# Patient Record
Sex: FEMALE | Race: WHITE | NOT HISPANIC OR LATINO | Employment: FULL TIME | URBAN - METROPOLITAN AREA
[De-identification: names, ages, dates, MRNs, and addresses within clinical notes are randomized per-mention and may not be internally consistent; named-entity substitution may affect disease eponyms.]

---

## 2023-04-17 ENCOUNTER — OFFICE VISIT (OUTPATIENT)
Dept: PRIMARY CARE | Facility: CLINIC | Age: 28
End: 2023-04-17
Payer: COMMERCIAL

## 2023-04-17 VITALS
DIASTOLIC BLOOD PRESSURE: 68 MMHG | TEMPERATURE: 98.1 F | SYSTOLIC BLOOD PRESSURE: 114 MMHG | BODY MASS INDEX: 23.02 KG/M2 | WEIGHT: 142.6 LBS

## 2023-04-17 DIAGNOSIS — G43.109 MIGRAINE WITH AURA AND WITHOUT STATUS MIGRAINOSUS, NOT INTRACTABLE: Primary | ICD-10-CM

## 2023-04-17 PROCEDURE — 1036F TOBACCO NON-USER: CPT | Performed by: FAMILY MEDICINE

## 2023-04-17 PROCEDURE — 99213 OFFICE O/P EST LOW 20 MIN: CPT | Performed by: FAMILY MEDICINE

## 2023-04-17 RX ORDER — ONDANSETRON 4 MG/1
4 TABLET, ORALLY DISINTEGRATING ORAL EVERY 8 HOURS PRN
Qty: 20 TABLET | Refills: 0 | Status: SHIPPED | OUTPATIENT
Start: 2023-04-17 | End: 2023-04-24

## 2023-04-17 RX ORDER — DROSPIRENONE AND ETHINYL ESTRADIOL 0.02-3(28)
KIT ORAL
COMMUNITY
Start: 2022-08-04

## 2023-04-17 RX ORDER — SUMATRIPTAN SUCCINATE 100 MG/1
100 TABLET ORAL ONCE AS NEEDED
Qty: 27 TABLET | Refills: 3 | Status: SHIPPED | OUTPATIENT
Start: 2023-04-17 | End: 2024-04-16

## 2023-04-17 RX ORDER — OMEPRAZOLE 20 MG/1
20 CAPSULE, DELAYED RELEASE ORAL
COMMUNITY
End: 2023-06-01

## 2023-04-17 ASSESSMENT — ENCOUNTER SYMPTOMS
CLUMSINESS: 0
VOMITING: 0
MEMORY LOSS: 0
AURA: 1
FATIGUE: 1
ALTERED MENTAL STATUS: 0
DIAPHORESIS: 0
FEVER: 0
BOWEL INCONTINENCE: 0
HEADACHES: 1
NAUSEA: 1
VERTIGO: 1
WEAKNESS: 1
NECK PAIN: 1
ABDOMINAL PAIN: 0
VISUAL CHANGE: 1
FOCAL WEAKNESS: 0
SHORTNESS OF BREATH: 0
CONFUSION: 0
LOSS OF BALANCE: 1
DIZZINESS: 1
NEAR-SYNCOPE: 0
SLURRED SPEECH: 0
NEUROLOGIC COMPLAINT: 1
LIGHT-HEADEDNESS: 1
BACK PAIN: 0
PALPITATIONS: 1
FOCAL SENSORY LOSS: 0

## 2023-04-17 NOTE — PROGRESS NOTES
"Subjective   Patient ID: Beatrice Strauss is a 27 y.o. female who presents for No chief complaint on file..  Headache   Associated symptoms include dizziness, a loss of balance, nausea, neck pain, a visual change and weakness. Pertinent negatives include no abdominal pain, back pain, fever or vomiting.   Acute Neurological Problem  The patient's primary symptoms include a loss of balance, a visual change and weakness. The patient's pertinent negatives include no altered mental status, clumsiness, focal sensory loss, focal weakness, memory loss, near-syncope, slurred speech or syncope. This is a recurrent problem. The current episode started more than 1 year ago. The neurological problem developed gradually. The problem has been rapidly worsening since onset. There was facial focality noted. Associated symptoms include an aura, chest pain, dizziness, fatigue, headaches, light-headedness, nausea, neck pain, palpitations and vertigo. Pertinent negatives include no abdominal pain, auditory change, back pain, bladder incontinence, bowel incontinence, confusion, diaphoresis, fever, shortness of breath or vomiting. Past treatments include acetaminophen, bed rest, drinking and sleep. The treatment provided moderate relief.     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    HEENT: OP clear. Sclera white. Pupils equal and round. EACs and TMs clear.    Neck: Supple. No palpable masses. Thyroid was not enlarged.    CVS: RRR, no murmurs. No peripheral edema.    Respiratory: Normal inspirations and expirations. Clear and equal breath sounds.    GI: Soft, nontender, " no masses or hepatosplenomegaly.       Assessment/Plan   Diagnoses and all orders for this visit:  Migraine with aura and without status migrainosus, not intractable  -     SUMAtriptan (Imitrex) 100 mg tablet; Take 1 tablet (100 mg) by mouth 1 time if needed for migraine. May repeat after 2 hours.  -     Referral to Neurology; Future  -     ondansetron ODT (Zofran-ODT) 4 mg disintegrating tablet; Take 1 tablet (4 mg) by mouth every 8 hours if needed for nausea or vomiting for up to 7 days.        Yamilet Alvarado MD  Family Medicine   [facility]

## 2023-05-25 DIAGNOSIS — Z00.00 ANNUAL PHYSICAL EXAM: ICD-10-CM

## 2023-05-30 ENCOUNTER — LAB (OUTPATIENT)
Dept: LAB | Facility: LAB | Age: 28
End: 2023-05-30
Payer: COMMERCIAL

## 2023-05-30 DIAGNOSIS — Z00.00 ANNUAL PHYSICAL EXAM: ICD-10-CM

## 2023-05-30 LAB
ALANINE AMINOTRANSFERASE (SGPT) (U/L) IN SER/PLAS: 10 U/L (ref 7–45)
ALBUMIN (G/DL) IN SER/PLAS: 4.1 G/DL (ref 3.4–5)
ALKALINE PHOSPHATASE (U/L) IN SER/PLAS: 53 U/L (ref 33–110)
ANION GAP IN SER/PLAS: 12 MMOL/L (ref 10–20)
ASPARTATE AMINOTRANSFERASE (SGOT) (U/L) IN SER/PLAS: 14 U/L (ref 9–39)
BASOPHILS (10*3/UL) IN BLOOD BY AUTOMATED COUNT: 0.07 X10E9/L (ref 0–0.1)
BASOPHILS/100 LEUKOCYTES IN BLOOD BY AUTOMATED COUNT: 1.2 % (ref 0–2)
BILIRUBIN TOTAL (MG/DL) IN SER/PLAS: 0.6 MG/DL (ref 0–1.2)
CALCIUM (MG/DL) IN SER/PLAS: 9.5 MG/DL (ref 8.6–10.6)
CARBON DIOXIDE, TOTAL (MMOL/L) IN SER/PLAS: 27 MMOL/L (ref 21–32)
CHLORIDE (MMOL/L) IN SER/PLAS: 104 MMOL/L (ref 98–107)
CHOLESTEROL (MG/DL) IN SER/PLAS: 184 MG/DL (ref 0–199)
CHOLESTEROL IN HDL (MG/DL) IN SER/PLAS: 58.8 MG/DL
CHOLESTEROL/HDL RATIO: 3.1
CREATININE (MG/DL) IN SER/PLAS: 0.79 MG/DL (ref 0.5–1.05)
EOSINOPHILS (10*3/UL) IN BLOOD BY AUTOMATED COUNT: 0.17 X10E9/L (ref 0–0.7)
EOSINOPHILS/100 LEUKOCYTES IN BLOOD BY AUTOMATED COUNT: 2.9 % (ref 0–6)
ERYTHROCYTE DISTRIBUTION WIDTH (RATIO) BY AUTOMATED COUNT: 11.6 % (ref 11.5–14.5)
ERYTHROCYTE MEAN CORPUSCULAR HEMOGLOBIN CONCENTRATION (G/DL) BY AUTOMATED: 32.7 G/DL (ref 32–36)
ERYTHROCYTE MEAN CORPUSCULAR VOLUME (FL) BY AUTOMATED COUNT: 91 FL (ref 80–100)
ERYTHROCYTES (10*6/UL) IN BLOOD BY AUTOMATED COUNT: 4.52 X10E12/L (ref 4–5.2)
GFR FEMALE: >90 ML/MIN/1.73M2
GLUCOSE (MG/DL) IN SER/PLAS: 77 MG/DL (ref 74–99)
HEMATOCRIT (%) IN BLOOD BY AUTOMATED COUNT: 41 % (ref 36–46)
HEMOGLOBIN (G/DL) IN BLOOD: 13.4 G/DL (ref 12–16)
IMMATURE GRANULOCYTES/100 LEUKOCYTES IN BLOOD BY AUTOMATED COUNT: 0.3 % (ref 0–0.9)
LDL: 101 MG/DL (ref 0–99)
LEUKOCYTES (10*3/UL) IN BLOOD BY AUTOMATED COUNT: 5.8 X10E9/L (ref 4.4–11.3)
LYMPHOCYTES (10*3/UL) IN BLOOD BY AUTOMATED COUNT: 2.56 X10E9/L (ref 1.2–4.8)
LYMPHOCYTES/100 LEUKOCYTES IN BLOOD BY AUTOMATED COUNT: 44.3 % (ref 13–44)
MONOCYTES (10*3/UL) IN BLOOD BY AUTOMATED COUNT: 0.5 X10E9/L (ref 0.1–1)
MONOCYTES/100 LEUKOCYTES IN BLOOD BY AUTOMATED COUNT: 8.7 % (ref 2–10)
NEUTROPHILS (10*3/UL) IN BLOOD BY AUTOMATED COUNT: 2.46 X10E9/L (ref 1.2–7.7)
NEUTROPHILS/100 LEUKOCYTES IN BLOOD BY AUTOMATED COUNT: 42.6 % (ref 40–80)
NRBC (PER 100 WBCS) BY AUTOMATED COUNT: 0 /100 WBC (ref 0–0)
PLATELETS (10*3/UL) IN BLOOD AUTOMATED COUNT: 278 X10E9/L (ref 150–450)
POTASSIUM (MMOL/L) IN SER/PLAS: 4.1 MMOL/L (ref 3.5–5.3)
PROTEIN TOTAL: 7.2 G/DL (ref 6.4–8.2)
SODIUM (MMOL/L) IN SER/PLAS: 139 MMOL/L (ref 136–145)
THYROTROPIN (MIU/L) IN SER/PLAS BY DETECTION LIMIT <= 0.05 MIU/L: 0.84 MIU/L (ref 0.44–3.98)
TRIGLYCERIDE (MG/DL) IN SER/PLAS: 123 MG/DL (ref 0–149)
UREA NITROGEN (MG/DL) IN SER/PLAS: 10 MG/DL (ref 6–23)
VLDL: 25 MG/DL (ref 0–40)

## 2023-05-30 PROCEDURE — 84443 ASSAY THYROID STIM HORMONE: CPT

## 2023-05-30 PROCEDURE — 85025 COMPLETE CBC W/AUTO DIFF WBC: CPT

## 2023-05-30 PROCEDURE — 80061 LIPID PANEL: CPT

## 2023-05-30 PROCEDURE — 80053 COMPREHEN METABOLIC PANEL: CPT

## 2023-05-30 PROCEDURE — 36415 COLL VENOUS BLD VENIPUNCTURE: CPT

## 2023-06-01 ENCOUNTER — OFFICE VISIT (OUTPATIENT)
Dept: PRIMARY CARE | Facility: CLINIC | Age: 28
End: 2023-06-01
Payer: COMMERCIAL

## 2023-06-01 VITALS
TEMPERATURE: 98.5 F | SYSTOLIC BLOOD PRESSURE: 110 MMHG | BODY MASS INDEX: 22.76 KG/M2 | WEIGHT: 141 LBS | DIASTOLIC BLOOD PRESSURE: 68 MMHG

## 2023-06-01 DIAGNOSIS — Z00.00 HEALTHCARE MAINTENANCE: Primary | ICD-10-CM

## 2023-06-01 PROCEDURE — 1036F TOBACCO NON-USER: CPT | Performed by: FAMILY MEDICINE

## 2023-06-01 PROCEDURE — 99395 PREV VISIT EST AGE 18-39: CPT | Performed by: FAMILY MEDICINE

## 2023-06-01 ASSESSMENT — PROMIS GLOBAL HEALTH SCALE
EMOTIONAL_PROBLEMS: SOMETIMES
CARRYOUT_SOCIAL_ACTIVITIES: VERY GOOD
RATE_AVERAGE_FATIGUE: MODERATE
RATE_AVERAGE_PAIN: 0
CARRYOUT_PHYSICAL_ACTIVITIES: MOSTLY
RATE_PHYSICAL_HEALTH: VERY GOOD
RATE_SOCIAL_SATISFACTION: FAIR
RATE_MENTAL_HEALTH: GOOD
RATE_GENERAL_HEALTH: VERY GOOD
RATE_QUALITY_OF_LIFE: VERY GOOD

## 2023-06-01 NOTE — PROGRESS NOTES
Subjective   Patient ID: Beatrice Strauss is a 27 y.o. female who presents for Employment Physical (EP. Here for pre employment physical).  HPI  Has migraines. Has an appt with neurologist in July for further eval/mgmt.  Will be moving to [place] to be closer to family.  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  Reports utd with immunizations- had Tdap in 2019  Had pap smear last year  Review of Systems  General: no fever or night sweats, no change in weight  Eyes: no vision disturbance  ENT: no mouth lesions, no sore throat, and no dysphagia  CV: no chest pain, no palpitations  Resp: no shortness of breath, no cough  GI: no abdominal pain, no change in bowel habits  : no urinary problems  MSK: no arthralgias, myalgias, or back pain  Skin; no rashes or new/changed skin lesions    Objective   Visit Vitals  /68   Temp 36.9 °C (98.5 °F) (Oral)      Physical Exam  Alert, well-appearing.    HEENT: OP clear. Sclera white. Pupils equal and round. EACs and TMs clear.    Neck: Supple. No palpable masses. Thyroid was not enlarged.    CVS: RRR, no murmurs.     Respiratory: Normal inspirations and expirations. Clear and equal breath sounds.    GI: Soft, nontender, no masses or hepatosplenomegaly.    Assessment/Plan   Diagnoses and all orders for this visit:  Healthcare maintenance        Yamilet Alvarado MD  Family Medicine   [facility]

## 2023-09-10 PROBLEM — H69.92 DYSFUNCTION OF LEFT EUSTACHIAN TUBE: Status: ACTIVE | Noted: 2023-09-10

## 2023-09-10 PROBLEM — K21.9 GERD (GASTROESOPHAGEAL REFLUX DISEASE): Status: ACTIVE | Noted: 2023-09-10

## 2023-09-10 PROBLEM — D23.9 DERMATOFIBROMA: Status: ACTIVE | Noted: 2023-09-10

## 2023-09-10 PROBLEM — B37.0 ORAL PHARYNGEAL CANDIDIASIS: Status: ACTIVE | Noted: 2023-09-10

## 2023-09-10 PROBLEM — R07.89 ATYPICAL CHEST PAIN: Status: ACTIVE | Noted: 2023-09-10

## 2023-09-10 PROBLEM — R12 HEARTBURN: Status: ACTIVE | Noted: 2023-09-10

## 2023-09-10 PROBLEM — H53.9 VISUAL DISTURBANCE: Status: ACTIVE | Noted: 2023-09-10

## 2023-09-10 RX ORDER — OMEPRAZOLE 20 MG/1
20 CAPSULE, DELAYED RELEASE ORAL
COMMUNITY

## 2023-09-10 RX ORDER — AMITRIPTYLINE HYDROCHLORIDE 10 MG/1
TABLET, FILM COATED ORAL
COMMUNITY
Start: 2023-08-23

## 2023-10-13 ENCOUNTER — APPOINTMENT (OUTPATIENT)
Dept: NEUROLOGY | Facility: CLINIC | Age: 28
End: 2023-10-13
Payer: COMMERCIAL

## 2024-05-16 ENCOUNTER — OFFICE VISIT (OUTPATIENT)
Age: 29
End: 2024-05-16
Payer: COMMERCIAL

## 2024-05-16 VITALS
BODY MASS INDEX: 23.69 KG/M2 | SYSTOLIC BLOOD PRESSURE: 106 MMHG | HEIGHT: 66 IN | DIASTOLIC BLOOD PRESSURE: 73 MMHG | WEIGHT: 147.4 LBS | HEART RATE: 74 BPM

## 2024-05-16 DIAGNOSIS — Z01.419 ENCOUNTER FOR ANNUAL ROUTINE GYNECOLOGICAL EXAMINATION: Primary | ICD-10-CM

## 2024-05-16 PROBLEM — D23.9 DERMATOFIBROMA: Status: ACTIVE | Noted: 2023-09-10

## 2024-05-16 PROCEDURE — 99385 PREV VISIT NEW AGE 18-39: CPT | Performed by: OBSTETRICS & GYNECOLOGY

## 2024-05-16 RX ORDER — DROSPIRENONE AND ETHINYL ESTRADIOL 0.02-3(28)
1 KIT ORAL DAILY
COMMUNITY

## 2024-05-16 RX ORDER — ACETAMINOPHEN AND CODEINE PHOSPHATE 120; 12 MG/5ML; MG/5ML
1 SOLUTION ORAL DAILY
Qty: 84 TABLET | Refills: 4 | Status: SHIPPED | OUTPATIENT
Start: 2024-05-16

## 2024-05-16 SDOH — ECONOMIC STABILITY: HOUSING INSECURITY
IN THE LAST 12 MONTHS, WAS THERE A TIME WHEN YOU DID NOT HAVE A STEADY PLACE TO SLEEP OR SLEPT IN A SHELTER (INCLUDING NOW)?: NO

## 2024-05-16 SDOH — ECONOMIC STABILITY: FOOD INSECURITY: WITHIN THE PAST 12 MONTHS, THE FOOD YOU BOUGHT JUST DIDN'T LAST AND YOU DIDN'T HAVE MONEY TO GET MORE.: NEVER TRUE

## 2024-05-16 SDOH — ECONOMIC STABILITY: FOOD INSECURITY: WITHIN THE PAST 12 MONTHS, YOU WORRIED THAT YOUR FOOD WOULD RUN OUT BEFORE YOU GOT MONEY TO BUY MORE.: NEVER TRUE

## 2024-05-16 SDOH — ECONOMIC STABILITY: INCOME INSECURITY: HOW HARD IS IT FOR YOU TO PAY FOR THE VERY BASICS LIKE FOOD, HOUSING, MEDICAL CARE, AND HEATING?: NOT HARD AT ALL

## 2024-05-16 ASSESSMENT — ENCOUNTER SYMPTOMS
COUGH: 0
SHORTNESS OF BREATH: 0
CONSTIPATION: 1
NAUSEA: 0
DIARRHEA: 0
WHEEZING: 0
ABDOMINAL PAIN: 0

## 2024-05-16 ASSESSMENT — PATIENT HEALTH QUESTIONNAIRE - PHQ9
SUM OF ALL RESPONSES TO PHQ9 QUESTIONS 1 & 2: 1
1. LITTLE INTEREST OR PLEASURE IN DOING THINGS: NOT AT ALL
SUM OF ALL RESPONSES TO PHQ QUESTIONS 1-9: 1
2. FEELING DOWN, DEPRESSED OR HOPELESS: SEVERAL DAYS
SUM OF ALL RESPONSES TO PHQ QUESTIONS 1-9: 1

## 2024-05-16 NOTE — PROGRESS NOTES
Nu Oliver is a 28-year-old nulligravida female whose LMP is 5-2-2024.  She is using the oral contraceptive pill for contraception and for dysmenorrhea.  She reports recently being diagnosed with migraines with aura.  Family history no ovarian, breast, colon or prostate CA.  No concerns with anxiety, depression, self-harm or suicidal ideation and no past or present history of physical sexual or verbal abuse.  Pap 6- normal      Patient presents for annual exam.     Past Medical History:   Diagnosis Date    GERD (gastroesophageal reflux disease)     Migraine with aura     Tonsillitis     recurrent        Past Surgical History:   Procedure Laterality Date    ENDOSCOPY, COLON, DIAGNOSTIC  2004 2010    TONSILLECTOMY  07/16/2015    ADRENOIDECTOMY     WISDOM TOOTH EXTRACTION  2022        Family History   Problem Relation Age of Onset    Diabetes Maternal Grandfather     Thyroid Cancer Maternal Grandfather     Thyroid Cancer Paternal Grandfather           Current Outpatient Medications:     drospirenone-ethinyl estradiol (ANN) 3-0.02 MG per tablet, Take 1 tablet by mouth daily, Disp: , Rfl:     norethindrone (ORTHO MICRONOR) 0.35 MG tablet, Take 1 tablet by mouth daily, Disp: 84 tablet, Rfl: 4    ondansetron (ZOFRAN ODT) 4 MG TBDP, Take 4 mg by mouth every 8 hours as needed for Nausea or Vomiting, Disp: 20 tablet, Rfl: 1    omeprazole (PRILOSEC) 20 MG capsule, Take 1 capsule by mouth daily, Disp: , Rfl:      Allergies   Allergen Reactions    Cefdinir Nausea Only     Omnicef        Social History       Tobacco History       Smoking Status  Never      Passive Exposure  Yes      Smokeless Tobacco Use  Never              Alcohol History       Alcohol Use Status  Yes Comment  occassional              Drug Use       Drug Use Status  No              Sexual Activity       Sexually Active  Yes Partners  Male Birth Control/Protection  Pill                     Review of Systems   Constitutional:  Negative for

## 2024-08-20 RX ORDER — ACETAMINOPHEN AND CODEINE PHOSPHATE 120; 12 MG/5ML; MG/5ML
1 SOLUTION ORAL DAILY
Qty: 84 TABLET | Refills: 2 | Status: SHIPPED | OUTPATIENT
Start: 2024-08-20

## 2024-10-11 NOTE — PROGRESS NOTES
Pt here to establish new GYN, was seeing Dr Guerrero at [facility]  Last pap June 2022 in Epic   Pt states she has a tilted uterus.  Pt states she had painful periods and was instructed by previous GYN to skip \"sugar pills\" every other month to prevent menses.  Pt still experiencing nausea when skipping sugar pills would like to discuss other options for relief of menstruation symptoms.        Male